# Patient Record
(demographics unavailable — no encounter records)

---

## 2024-10-31 NOTE — IMAGING
[de-identified] : No erythema, no discharge, no increased warmth to touch. ROM deferred due to fx status, strength testing deferred due to fx status. Distally neurovascularly intact with median, radial, ulnar, MSC, axillary nerves intact. 2+ radial pulse with capillary refill <2 seconds. Able to make fist, oppose thumb to small finger and abduct fingers. Sensation intact in median, ulnar and superficial radial.   X-rays of the LEFT SHOULDER show proximal humerus fracture

## 2024-10-31 NOTE — DISCUSSION/SUMMARY
[de-identified] : Assessment:   The patient is a 52 year old male with physical exam findings consistent with LEFT PROXIMAL HUMERUS FX   Most proximal humerus fractures are minimally displaced and can be treated with conservative approaches such as the as needed use of a sling to immobilize the arm and early physical therapy to improve the functional outcome. Surgery may be necessary for displaced fractures.   - Advised that this fracture may take 6-8 weeks for healing.  -Sling prn.  - Advised that forearm swelling and ecchymosis may be present on the following days.  - Advised that we will be monitoring healing of the fracture with x-rays on the future appointments.  Follow up: 2 months w/ new X-ray

## 2024-10-31 NOTE — HISTORY OF PRESENT ILLNESS
[de-identified] : 10/25/24:  Lt shoulder prox humerus fracture after falling onto the left side at [employer] about 3 weeks ago. Had been treating with Dr Snyder @ [facility]. Patient had been in sling since injury and has not started any PT yet

## 2024-10-31 NOTE — HISTORY OF PRESENT ILLNESS
[de-identified] : 10/25/24:  Lt shoulder prox humerus fracture after falling onto the left side at [employer] about 3 weeks ago. Had been treating with Dr Snyder @ [facility]. Patient had been in sling since injury and has not started any PT yet

## 2024-10-31 NOTE — IMAGING
[de-identified] : No erythema, no discharge, no increased warmth to touch. ROM deferred due to fx status, strength testing deferred due to fx status. Distally neurovascularly intact with median, radial, ulnar, MSC, axillary nerves intact. 2+ radial pulse with capillary refill <2 seconds. Able to make fist, oppose thumb to small finger and abduct fingers. Sensation intact in median, ulnar and superficial radial.   X-rays of the LEFT SHOULDER show proximal humerus fracture

## 2024-10-31 NOTE — DISCUSSION/SUMMARY
[de-identified] : Assessment:   The patient is a 52 year old male with physical exam findings consistent with LEFT PROXIMAL HUMERUS FX   Most proximal humerus fractures are minimally displaced and can be treated with conservative approaches such as the as needed use of a sling to immobilize the arm and early physical therapy to improve the functional outcome. Surgery may be necessary for displaced fractures.   - Advised that this fracture may take 6-8 weeks for healing.  -Sling prn.  - Advised that forearm swelling and ecchymosis may be present on the following days.  - Advised that we will be monitoring healing of the fracture with x-rays on the future appointments.  Follow up: 2 months w/ new X-ray

## 2024-12-27 NOTE — DISCUSSION/SUMMARY
[de-identified] : Assessment:   The patient is a 52 year old male with physical exam findings consistent with LEFT PROXIMAL HUMERUS FX   Most proximal humerus fractures are minimally displaced and can be treated with conservative approaches such as the as needed use of a sling to immobilize the arm and early physical therapy to improve the functional outcome. Surgery may be necessary for displaced fractures.   - Fracture is healing well  - Continue with PT, rx renewed  - HEP discussed  - Advised that we will be monitoring healing of the fracture with x-rays on the future appointments.  Follow up: 2 months and repeat shoulder XR

## 2024-12-27 NOTE — IMAGING
[de-identified] : No erythema, no discharge, no increased warmth to touch. , 15, back pocket. strength testing deferred due to fx status. Distally neurovascularly intact with median, radial, ulnar, MSC, axillary nerves intact. 2+ radial pulse with capillary refill <2 seconds. Able to make fist, oppose thumb to small finger and abduct fingers. Sensation intact in median, ulnar and superficial radial.   X-rays of the LEFT SHOULDER show proximal humerus fracture- healing noted

## 2024-12-27 NOTE — HISTORY OF PRESENT ILLNESS
[de-identified] : 12/27/2024: Pt here today for follow-up left proximal humerus fx. Pain and symptoms are ***[better/worsening/the same]***. Since last visit pt has been ***. Today c/o ***.  10/25/24:  Lt shoulder prox humerus fracture after falling onto the left side at [employer] [place] about 3 weeks ago. Had been treating with Dr Snyder @ [facility]. Patient had been in sling since injury and has not started any PT yet